# Patient Record
Sex: FEMALE
[De-identification: names, ages, dates, MRNs, and addresses within clinical notes are randomized per-mention and may not be internally consistent; named-entity substitution may affect disease eponyms.]

---

## 2022-01-17 ENCOUNTER — HOSPITAL ENCOUNTER (EMERGENCY)
Dept: HOSPITAL 5 - ED | Age: 57
End: 2022-01-17
Payer: SELF-PAY

## 2022-01-17 VITALS — SYSTOLIC BLOOD PRESSURE: 149 MMHG | DIASTOLIC BLOOD PRESSURE: 102 MMHG
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
## 2022-01-17 DIAGNOSIS — S11.95XA: ICD-10-CM
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
## 2022-01-17 DIAGNOSIS — Z53.21: ICD-10-CM

## 2022-01-17 DIAGNOSIS — R51.9: Primary | ICD-10-CM

## 2022-02-02 ENCOUNTER — HOSPITAL ENCOUNTER (EMERGENCY)
Dept: HOSPITAL 5 - ED | Age: 57
Discharge: HOME | End: 2022-02-02
Payer: SELF-PAY

## 2022-02-02 VITALS — DIASTOLIC BLOOD PRESSURE: 97 MMHG | SYSTOLIC BLOOD PRESSURE: 163 MMHG

## 2022-02-02 DIAGNOSIS — H65.192: Primary | ICD-10-CM

## 2022-02-02 PROCEDURE — 99282 EMERGENCY DEPT VISIT SF MDM: CPT

## 2022-02-02 NOTE — EMERGENCY DEPARTMENT REPORT
ED General Adult HPI





- General


Chief complaint: Earache


Stated complaint: POSS EAR INFECTION BODY PAIN


Source: patient


Mode of arrival: Ambulatory


Limitations: No Limitations





- History of Present Illness


Initial comments: 





Patient is a 56-year-old -American female with no past medical history 

presented to the ED with complaint of acute onset persistent severe left ear judith

n for the last 1 week. Patient states that the pain has worsened in the last 2 

days despite taking pain medication at home. Patient denies dizziness, syncope, 

hearing loss, nausea and vomiting, chest pain, fever and chills, traumatic 

injury, headache, sore throat, nasal and sinus congestion or back pain and neck 

pain.


MD Complaint: left ear pain


-: Sudden, week(s) (1)


Location: face (left ear pain)


Radiation: non-radiation


Severity scale (0 -10): 8


Quality: aching, sharp


Consistency: constant


Improves with: none


Worsens with: none


Associated Symptoms: denies other symptoms.  denies: confusion, chest pain, 

cough, diaphoresis, fever/chills, headaches, malaise, nausea/vomiting, rash, 

seizure, shortness of breath, syncope, weakness, other


Treatments Prior to Arrival: none





- Related Data


                                  Previous Rx's











 Medication  Instructions  Recorded  Last Taken  Type


 


Amoxicillin/Potassium Clav 1 each PO Q12H #20 tab 02/02/22 Unknown Rx





[Augmentin 875-125 Tablet]    


 


Ibuprofen [Motrin] 600 mg PO Q8H PRN #30 tablet 02/02/22 Unknown Rx


 


Ofloxacin 0.3% [Floxin 0.3% Otic] 1 drop OT Q12H #5 ml 02/02/22 Unknown Rx


 


traMADoL [Ultram] 50 mg PO Q6HR PRN #12 tablet 02/02/22 Unknown Rx











                                    Allergies











Allergy/AdvReac Type Severity Reaction Status Date / Time


 


No Known Allergies Allergy   Unverified 01/17/22 15:22














ED Review of Systems


ROS: 


Stated complaint: POSS EAR INFECTION BODY PAIN


Other details as noted in HPI





Constitutional: denies: chills, fever


Eyes: denies: eye pain, eye discharge, vision change


ENT: ear pain (LEFT EAR PAIN).  denies: throat pain


Respiratory: denies: cough, shortness of breath, wheezing


Cardiovascular: denies: chest pain, palpitations


Endocrine: no symptoms reported


Gastrointestinal: denies: abdominal pain, nausea, vomiting, diarrhea


Genitourinary: denies: urgency, dysuria, discharge


Musculoskeletal: denies: back pain, joint swelling, arthralgia


Skin: denies: rash, lesions


Neurological: denies: headache, weakness, paresthesias


Psychiatric: denies: anxiety, depression


Hematological/Lymphatic: denies: easy bleeding, easy bruising





ED Past Medical Hx





- Past Medical History


Previous Medical History?: No





- Surgical History


Past Surgical History?: No





- Medications


Home Medications: 


                                Home Medications











 Medication  Instructions  Recorded  Confirmed  Last Taken  Type


 


Amoxicillin/Potassium Clav 1 each PO Q12H #20 tab 02/02/22  Unknown Rx





[Augmentin 875-125 Tablet]     


 


Ibuprofen [Motrin] 600 mg PO Q8H PRN #30 tablet 02/02/22  Unknown Rx


 


Ofloxacin 0.3% [Floxin 0.3% Otic] 1 drop OT Q12H #5 ml 02/02/22  Unknown Rx


 


traMADoL [Ultram] 50 mg PO Q6HR PRN #12 tablet 02/02/22  Unknown Rx














ED Physical Exam





- General


Limitations: No Limitations


General appearance: alert, in no apparent distress





- Head


Head exam: Present: atraumatic, normocephalic, normal inspection





- Eye


Eye exam: Present: normal appearance, PERRL, EOMI


Pupils: Present: normal accommodation





- ENT


ENT exam: Present: normal orophraynx, mucous membranes moist, normal external 

ear exam, other (Erythematous bulging tympanic membrane with effusion)





- Neck


Neck exam: Present: normal inspection, full ROM, lymphadenopathy





- Respiratory


Respiratory exam: Present: normal lung sounds bilaterally.  Absent: respiratory 

distress, wheezes, rales, rhonchi, chest wall tenderness, accessory muscle use, 

decreased breath sounds, prolonged expiratory





- Cardiovascular


Cardiovascular Exam: Present: normal rhythm, tachycardia, normal heart sounds.  

Absent: systolic murmur, diastolic murmur, rubs, gallop





- GI/Abdominal


GI/Abdominal exam: Present: soft, normal bowel sounds.  Absent: tenderness, 

guarding, hyperactive bowel sounds, hypoactive bowel sounds, organomegaly, mass,

 bruit





- Extremities Exam


Extremities exam: Present: normal inspection, full ROM, normal capillary refill





- Back Exam


Back exam: Present: normal inspection, full ROM.  Absent: tenderness, CVA 

tenderness (R), CVA tenderness (L), muscle spasm, paraspinal tenderness, 

vertebral tenderness





- Neurological Exam


Neurological exam: Present: alert, oriented X3, CN II-XII intact, normal gait, 

reflexes normal





- Psychiatric


Psychiatric exam: Present: normal affect, normal mood





- Skin


Skin exam: Present: warm, dry, intact, normal color.  Absent: rash





ED Course





                                   Vital Signs











  02/02/22





  02:06


 


Temperature 97.8 F


 


Pulse Rate 100 H


 


Respiratory 19





Rate 


 


Blood Pressure 163/97





[Right] 


 


O2 Sat by Pulse 99





Oximetry 














ED Medical Decision Making





- Medical Decision Making





This is a 56-year-old -American female with no past medical history 

presented to the ED with complaint of acute onset persistent severe left ear 

pain for the last 1 week. Patient states that the pain has worsened in the last 

2 days despite taking pain medication at home. In the ED, patient is alert and 

oriented x3 and is not in any distress. Patient was treated for pain in the ED 

and based on the history and physical exam findings, the patient was discharged 

home on pain medication and antibiotics and advised to follow-up with her 

primary care physician in 7 to 10 days for reevaluation. Patient advised return 

to the ED immediately if symptoms get worse. Patient was also referred to the 

ENT physician Dr. Blankenship for further evaluation in 7 to 10 days. Patient was 

advised to contact Dr. Blankenship' office to schedule a follow-up appointment.





- Differential Diagnosis


Otitis media; otitis externa; ruptured tympanic membrane; mastoiditis


Critical care attestation.: 


If time is entered above; I have spent that time in minutes in the direct care 

of this critically ill patient, excluding procedure time.








ED Disposition


Clinical Impression: 


 Acute otitis media with effusion of left ear





Disposition: 01 HOME / SELF CARE / HOMELESS


Is pt being admited?: No


Does the pt Need Aspirin: No


Condition: Stable


Instructions:  Otitis Media (ED), Ear Drops, Adult, Easy-to-Read, Otitis Media, 

Adult, Easy-to-Read


Additional Instructions: 


Take medication with food, drink plenty of fluids and follow-up with your 

primary care physician in 7 to 10 days for reevaluation. Return to the ED 

immediately if symptoms get worse.


Prescriptions: 


Amoxicillin/Potassium Clav [Augmentin 875-125 Tablet] 1 each PO Q12H #20 tab


Ofloxacin 0.3% [Floxin 0.3% Otic] 1 drop OT Q12H #5 ml


Ibuprofen [Motrin] 600 mg PO Q8H PRN #30 tablet


 PRN Reason: Pain


traMADoL [Ultram] 50 mg PO Q6HR PRN #12 tablet


 PRN Reason: Pain


Referrals: 


[facility] [Provider Group] - 7-10 days


HARMONY BLANKENSHIP MD [Staff Physician] - 7-10 days


Time of Disposition: 02:49


Print Language: ENGLISH